# Patient Record
Sex: MALE | Race: WHITE
[De-identification: names, ages, dates, MRNs, and addresses within clinical notes are randomized per-mention and may not be internally consistent; named-entity substitution may affect disease eponyms.]

---

## 2019-03-08 ENCOUNTER — HOSPITAL ENCOUNTER (EMERGENCY)
Dept: HOSPITAL 41 - JD.ED | Age: 49
Discharge: HOME | End: 2019-03-08
Payer: COMMERCIAL

## 2019-03-08 DIAGNOSIS — F41.9: ICD-10-CM
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
## 2019-03-08 DIAGNOSIS — Z79.899: ICD-10-CM

## 2019-03-08 DIAGNOSIS — S61.214A: Primary | ICD-10-CM

## 2019-03-08 DIAGNOSIS — I10: ICD-10-CM

## 2019-03-08 DIAGNOSIS — F32.9: ICD-10-CM

## 2019-03-08 DIAGNOSIS — Z23: ICD-10-CM

## 2019-03-08 NOTE — EDM.PDOC
ED HPI GENERAL MEDICAL PROBLEM





- General


Chief Complaint: Laceration


Stated Complaint: RT RING FINGER LAC


Time Seen by Provider: 03/08/19 19:03


Source of Information: Reports: Patient


History Limitations: Reports: No Limitations





- History of Present Illness


INITIAL COMMENTS - FREE TEXT/NARRATIVE: 





This is a 48-year-old  male. He was packing some household goods into 

a bag and he reached in the bag and there was a razor blade that the guard came 

off of and he hit his right ring finger on the razor blade causing a chunk of 

skin to be avulsed. It was bleeding quite a bit so he comes to the ER for 

evaluation. He is not up-to-date with his tetanus. He denies any other acute 

symptoms.


  ** Right Finger-Ring


Pain Score (Numeric/FACES): 4





- Related Data


 Allergies











Allergy/AdvReac Type Severity Reaction Status Date / Time


 


No Known Allergies Allergy   Verified 02/13/18 07:03











Home Meds: 


 Home Meds





Escitalopram [Lexapro] 10 mg PO DAILY 08/10/18 [History]


Olmesartan/Hydrochlorothiazide [Benicar HCT 40-12.5 MG] 1 each PO DAILY 03/08/ 19 [History]











Past Medical History


Cardiovascular History: Reports: Hypertension


Psychiatric History: Reports: Anxiety, Depression





- Past Surgical History


HEENT Surgical History: Reports: Oral Surgery, Tonsillectomy





Social & Family History





- Tobacco Use


Smoking Status *Q: Never Smoker





- Caffeine Use


Caffeine Use: Reports: Coffee, Soda





- Recreational Drug Use


Recreational Drug Use: No





- Living Situation & Occupation


Living situation: Reports: 


Occupation: Employed





ED ROS GENERAL





- Review of Systems


Review Of Systems: See Below


Constitutional: Denies: Fever, Chills


HEENT: Reports: No Symptoms


Respiratory: Reports: No Symptoms


Cardiovascular: Reports: No Symptoms


Endocrine: Reports: No Symptoms


GI/Abdominal: Reports: No Symptoms


: Reports: No Symptoms


Musculoskeletal: Reports: Other (As per history of present illness)


Skin: Reports: Other (As per history of present illness)


Neurological: Reports: No Symptoms


Psychiatric: Reports: No Symptoms





ED EXAM, SKIN/RASH


Exam: See Below


Exam Limited By: No Limitations


General Appearance: Alert, WD/WN, No Apparent Distress


Eye Exam: Bilateral Eye: Normal Inspection


Ears: Normal External Exam


Nose: Normal Inspection


Throat/Mouth: Normal Lips, Normal Voice, No Airway Compromise


Head: Normocephalic


Neck: Supple


Respiratory/Chest: No Respiratory Distress


Back Exam: Full Range of Motion


Extremities: Other (On his right ring finger on the very tip he has a 3 mm by 

about 8 mm full-thickness tissue off the tip, but this is not something that 

can be sutured together is just missing tissue, bleeding is controlled at this 

time, there is no other acute injuries)


Neurological: Alert, Oriented


Psychiatric: Normal Affect, Normal Mood


Skin: Warm, Dry





Course





- Vital Signs


Last Recorded V/S: 


 Last Vital Signs











Temp  98.9 F   03/08/19 18:42


 


Pulse  79   03/08/19 18:42


 


Resp  16   03/08/19 18:42


 


BP  159/92 H  03/08/19 18:42


 


Pulse Ox  100   03/08/19 18:42














- Orders/Labs/Meds


Orders: 


 Active Orders 24 hr











 Category Date Time Status


 


 Vaccines to be Administered [RC] PER UNIT ROUTINE Care  03/08/19 19:24 Active











Meds: 


Medications














Discontinued Medications














Generic Name Dose Route Start Last Admin





  Trade Name Lance  PRN Reason Stop Dose Admin


 


Diphtheria/Tetanus/Acell Pertussis  0.5 ml  03/08/19 19:24  03/08/19 19:42





  Adacel  IM  03/08/19 19:25  0.5 ml





  .ONCE ONE   Administration





     





     





     





     














- Re-Assessments/Exams


Free Text/Narrative Re-Assessment/Exam: 





03/08/2019 19:45


I spoke to the patient regarding the wound that there is nothing there to 

suture. We will clean it up and put some antibiotic ointment and a dressing on 

that finger and that when he goes to take that off in the next 24 hours he 

needs to soak it really well so when he pulls the Band-Aid off and doesn't 

share off the scab. We will provide a tetanus booster as well.





Departure





- Departure


Time of Disposition: 20:03


Disposition: Home, Self-Care 01


Condition: Good


Clinical Impression: 


Laceration of right ring finger


Qualifiers:


 Encounter type: initial encounter Damage to nail status: without damage 

Foreign body presence: without foreign body Qualified Code(s): S61.214A - 

Laceration without foreign body of right ring finger without damage to nail, 

initial encounter








- Discharge Information


*PRESCRIPTION DRUG MONITORING PROGRAM REVIEWED*: Not Applicable


*COPY OF PRESCRIPTION DRUG MONITORING REPORT IN PATIENT ABBY: Not Applicable


Instructions:  Laceration Care, Adult


Referrals: 


Raudel Romero MD [Primary Care Provider] - 


Forms:  ED Department Discharge


Additional Instructions: 


Change the dressing twice a day and make sure you soak the dressing before you 

take it off so you don't tear the scab, watch for infection, follow-up with 

your family doctor as needed or the ER as needed





- My Orders


Last 24 Hours: 


My Active Orders





03/08/19 19:24


Vaccines to be Administered [RC] PER UNIT ROUTINE 














- Assessment/Plan


Last 24 Hours: 


My Active Orders





03/08/19 19:24


Vaccines to be Administered [RC] PER UNIT ROUTINE

## 2023-10-31 ENCOUNTER — HOSPITAL ENCOUNTER (EMERGENCY)
Dept: HOSPITAL 41 - JD.ED | Age: 53
Discharge: HOME | End: 2023-10-31
Payer: COMMERCIAL
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
## 2023-10-31 DIAGNOSIS — S16.1XXA: Primary | ICD-10-CM
